# Patient Record
Sex: FEMALE | Race: WHITE | NOT HISPANIC OR LATINO | URBAN - METROPOLITAN AREA
[De-identification: names, ages, dates, MRNs, and addresses within clinical notes are randomized per-mention and may not be internally consistent; named-entity substitution may affect disease eponyms.]

---

## 2018-05-30 ENCOUNTER — IMPORTED ENCOUNTER (OUTPATIENT)
Dept: URBAN - METROPOLITAN AREA CLINIC 1 | Facility: CLINIC | Age: 23
End: 2018-05-30

## 2018-05-30 PROBLEM — H52.13: Noted: 2018-05-30

## 2018-05-30 PROCEDURE — S0620 ROUTINE OPHTHALMOLOGICAL EXA: HCPCS

## 2018-05-30 NOTE — PATIENT DISCUSSION
1. Myopia -- Glasses MRx was given for correction if indicated and requested2. Allergic Conjunctivitis OU -- Recommend OTC Zaditor BID OU PRN Itching OUAir Optix HydraGlyde CTL Trials given to patient today. Return for an appointment in 1 [address] [facility] with Dr. Mariah Guerrero. Return for an appointment in 1 YR 40 / [facility] with Dr. Mariah Guerrero.

## 2018-06-06 ENCOUNTER — IMPORTED ENCOUNTER (OUTPATIENT)
Dept: URBAN - METROPOLITAN AREA CLINIC 1 | Facility: CLINIC | Age: 23
End: 2018-06-06

## 2019-06-20 ENCOUNTER — IMPORTED ENCOUNTER (OUTPATIENT)
Dept: URBAN - METROPOLITAN AREA CLINIC 1 | Facility: CLINIC | Age: 24
End: 2019-06-20

## 2019-06-20 PROBLEM — H52.13: Noted: 2019-06-20

## 2019-06-20 PROCEDURE — S0621 ROUTINE OPHTHALMOLOGICAL EXA: HCPCS

## 2019-06-20 NOTE — PATIENT DISCUSSION
1. Myopia: Rx was given for correction if indicated and requested. 2. Allergic conj-OU- Cont using Zaditor BID OU PRN. (if symptoms worsen will REC daily lens) 3. Return for an appointment in 1 year for 42 and Contact lens check. with Dr. Zev Paz.

## 2022-03-11 ENCOUNTER — COMPREHENSIVE EXAM (OUTPATIENT)
Dept: URBAN - METROPOLITAN AREA CLINIC 1 | Facility: CLINIC | Age: 27
End: 2022-03-11

## 2022-03-11 DIAGNOSIS — Z01.00: ICD-10-CM

## 2022-03-11 DIAGNOSIS — H52.13: ICD-10-CM

## 2022-03-11 PROCEDURE — S0621 ROUTINE OPHTHALMOLOGICAL EXA: HCPCS

## 2022-03-11 ASSESSMENT — TONOMETRY
OS_IOP_MMHG: 14
OD_IOP_MMHG: 14

## 2022-03-11 ASSESSMENT — VISUAL ACUITY
OD_CC: 20/20
OD_CC: J1+
OS_CC: J1+
OS_CC: 20/20

## 2022-04-02 ASSESSMENT — KERATOMETRY
OD_AXISANGLE2_DEGREES: 102
OS_AXISANGLE2_DEGREES: 113
OS_K2POWER_DIOPTERS: 42.75
OS_K1POWER_DIOPTERS: 42.50
OD_AXISANGLE_DEGREES: 012
OS_AXISANGLE_DEGREES: 023
OD_K2POWER_DIOPTERS: 42.75
OD_K1POWER_DIOPTERS: 42.50

## 2022-04-02 ASSESSMENT — VISUAL ACUITY
OD_CC: J1+
OS_SC: 20/20
OD_SC: 20/25
OS_SC: 20/20
OD_SC: 20/20
OS_CC: J1+

## 2022-04-02 ASSESSMENT — TONOMETRY
OS_IOP_MMHG: 17
OD_IOP_MMHG: 15
OD_IOP_MMHG: 16
OS_IOP_MMHG: 15

## 2025-01-11 ENCOUNTER — HOSPITAL ENCOUNTER (EMERGENCY)
Facility: CLINIC | Age: 30
Discharge: HOME | End: 2025-01-11
Attending: EMERGENCY MEDICINE | Admitting: EMERGENCY MEDICINE

## 2025-01-11 ENCOUNTER — APPOINTMENT (OUTPATIENT)
Dept: GENERAL RADIOLOGY | Facility: CLINIC | Age: 30
End: 2025-01-11
Attending: EMERGENCY MEDICINE

## 2025-01-11 VITALS
SYSTOLIC BLOOD PRESSURE: 122 MMHG | DIASTOLIC BLOOD PRESSURE: 79 MMHG | OXYGEN SATURATION: 97 % | RESPIRATION RATE: 16 BRPM | HEART RATE: 87 BPM | TEMPERATURE: 98 F

## 2025-01-11 DIAGNOSIS — R06.02 SOB (SHORTNESS OF BREATH): ICD-10-CM

## 2025-01-11 DIAGNOSIS — J18.9 COMMUNITY ACQUIRED PNEUMONIA OF RIGHT MIDDLE LOBE OF LUNG: Primary | ICD-10-CM

## 2025-01-11 DIAGNOSIS — J45.909 REACTIVE AIRWAY DISEASE WITHOUT COMPLICATION, UNSPECIFIED ASTHMA SEVERITY, UNSPECIFIED WHETHER PERSISTENT: ICD-10-CM

## 2025-01-11 LAB
ANION GAP SERPL CALCULATED.3IONS-SCNC: 16 MMOL/L (ref 7–15)
ATRIAL RATE - MUSE: 128 BPM
BASE EXCESS BLDV CALC-SCNC: -1 MMOL/L (ref -3–3)
BASE EXCESS BLDV CALC-SCNC: -3 MMOL/L (ref -3–3)
BASOPHILS # BLD AUTO: 0.1 10E3/UL (ref 0–0.2)
BASOPHILS NFR BLD AUTO: 1 %
BUN SERPL-MCNC: 10.2 MG/DL (ref 6–20)
CALCIUM SERPL-MCNC: 9.5 MG/DL (ref 8.8–10.4)
CHLORIDE SERPL-SCNC: 104 MMOL/L (ref 98–107)
CREAT SERPL-MCNC: 0.97 MG/DL (ref 0.51–0.95)
D DIMER PPP FEU-MCNC: 0.39 UG/ML FEU (ref 0–0.5)
DIASTOLIC BLOOD PRESSURE - MUSE: NORMAL MMHG
EGFRCR SERPLBLD CKD-EPI 2021: 82 ML/MIN/1.73M2
EOSINOPHIL # BLD AUTO: 0.4 10E3/UL (ref 0–0.7)
EOSINOPHIL NFR BLD AUTO: 2 %
ERYTHROCYTE [DISTWIDTH] IN BLOOD BY AUTOMATED COUNT: 12.3 % (ref 10–15)
FLUAV RNA SPEC QL NAA+PROBE: NEGATIVE
FLUBV RNA RESP QL NAA+PROBE: NEGATIVE
GLUCOSE SERPL-MCNC: 133 MG/DL (ref 70–99)
HCG SERPL QL: NEGATIVE
HCO3 BLDV-SCNC: 21 MMOL/L (ref 21–28)
HCO3 BLDV-SCNC: 22 MMOL/L (ref 21–28)
HCO3 SERPL-SCNC: 21 MMOL/L (ref 22–29)
HCT VFR BLD AUTO: 43.3 % (ref 35–47)
HGB BLD-MCNC: 14.5 G/DL (ref 11.7–15.7)
IMM GRANULOCYTES # BLD: 0.1 10E3/UL
IMM GRANULOCYTES NFR BLD: 1 %
INTERPRETATION ECG - MUSE: NORMAL
LACTATE BLD-SCNC: 2 MMOL/L
LACTATE BLD-SCNC: 2.2 MMOL/L
LYMPHOCYTES # BLD AUTO: 3.4 10E3/UL (ref 0.8–5.3)
LYMPHOCYTES NFR BLD AUTO: 19 %
MCH RBC QN AUTO: 30.2 PG (ref 26.5–33)
MCHC RBC AUTO-ENTMCNC: 33.5 G/DL (ref 31.5–36.5)
MCV RBC AUTO: 90 FL (ref 78–100)
MONOCYTES # BLD AUTO: 1.5 10E3/UL (ref 0–1.3)
MONOCYTES NFR BLD AUTO: 8 %
NEUTROPHILS # BLD AUTO: 12.7 10E3/UL (ref 1.6–8.3)
NEUTROPHILS NFR BLD AUTO: 70 %
NRBC # BLD AUTO: 0 10E3/UL
NRBC BLD AUTO-RTO: 0 /100
P AXIS - MUSE: 79 DEGREES
PCO2 BLDV: 33 MM HG (ref 40–50)
PCO2 BLDV: 33 MM HG (ref 40–50)
PH BLDV: 7.4 [PH] (ref 7.32–7.43)
PH BLDV: 7.43 [PH] (ref 7.32–7.43)
PLATELET # BLD AUTO: 399 10E3/UL (ref 150–450)
PO2 BLDV: 52 MM HG (ref 25–47)
PO2 BLDV: 53 MM HG (ref 25–47)
POTASSIUM SERPL-SCNC: 4 MMOL/L (ref 3.4–5.3)
PR INTERVAL - MUSE: 168 MS
QRS DURATION - MUSE: 70 MS
QT - MUSE: 284 MS
QTC - MUSE: 414 MS
R AXIS - MUSE: 91 DEGREES
RBC # BLD AUTO: 4.8 10E6/UL (ref 3.8–5.2)
RSV RNA SPEC NAA+PROBE: NEGATIVE
SAO2 % BLDV: 87 % (ref 70–75)
SAO2 % BLDV: 88 % (ref 70–75)
SARS-COV-2 RNA RESP QL NAA+PROBE: NEGATIVE
SODIUM SERPL-SCNC: 141 MMOL/L (ref 135–145)
SYSTOLIC BLOOD PRESSURE - MUSE: NORMAL MMHG
T AXIS - MUSE: 55 DEGREES
TROPONIN T SERPL HS-MCNC: <6 NG/L
VENTRICULAR RATE- MUSE: 128 BPM
WBC # BLD AUTO: 18.1 10E3/UL (ref 4–11)

## 2025-01-11 PROCEDURE — 71046 X-RAY EXAM CHEST 2 VIEWS: CPT

## 2025-01-11 PROCEDURE — 99285 EMERGENCY DEPT VISIT HI MDM: CPT | Mod: 25

## 2025-01-11 PROCEDURE — 250N000013 HC RX MED GY IP 250 OP 250 PS 637: Performed by: EMERGENCY MEDICINE

## 2025-01-11 PROCEDURE — 250N000009 HC RX 250: Performed by: EMERGENCY MEDICINE

## 2025-01-11 PROCEDURE — 250N000012 HC RX MED GY IP 250 OP 636 PS 637: Performed by: EMERGENCY MEDICINE

## 2025-01-11 PROCEDURE — 96360 HYDRATION IV INFUSION INIT: CPT

## 2025-01-11 PROCEDURE — 36415 COLL VENOUS BLD VENIPUNCTURE: CPT | Performed by: EMERGENCY MEDICINE

## 2025-01-11 PROCEDURE — 85379 FIBRIN DEGRADATION QUANT: CPT | Performed by: EMERGENCY MEDICINE

## 2025-01-11 PROCEDURE — 84703 CHORIONIC GONADOTROPIN ASSAY: CPT | Performed by: EMERGENCY MEDICINE

## 2025-01-11 PROCEDURE — 258N000003 HC RX IP 258 OP 636: Performed by: EMERGENCY MEDICINE

## 2025-01-11 PROCEDURE — 94640 AIRWAY INHALATION TREATMENT: CPT

## 2025-01-11 PROCEDURE — 93005 ELECTROCARDIOGRAM TRACING: CPT

## 2025-01-11 PROCEDURE — 83605 ASSAY OF LACTIC ACID: CPT

## 2025-01-11 PROCEDURE — 84484 ASSAY OF TROPONIN QUANT: CPT | Performed by: EMERGENCY MEDICINE

## 2025-01-11 PROCEDURE — 82803 BLOOD GASES ANY COMBINATION: CPT

## 2025-01-11 PROCEDURE — 87637 SARSCOV2&INF A&B&RSV AMP PRB: CPT | Performed by: EMERGENCY MEDICINE

## 2025-01-11 PROCEDURE — 85014 HEMATOCRIT: CPT | Performed by: EMERGENCY MEDICINE

## 2025-01-11 PROCEDURE — 80048 BASIC METABOLIC PNL TOTAL CA: CPT | Performed by: EMERGENCY MEDICINE

## 2025-01-11 PROCEDURE — 85025 COMPLETE CBC W/AUTO DIFF WBC: CPT | Performed by: EMERGENCY MEDICINE

## 2025-01-11 RX ORDER — ALBUTEROL SULFATE 90 UG/1
1-2 INHALANT RESPIRATORY (INHALATION) EVERY 4 HOURS PRN
Qty: 18 G | Refills: 0 | Status: SHIPPED | OUTPATIENT
Start: 2025-01-11

## 2025-01-11 RX ORDER — PREDNISONE 20 MG/1
40 TABLET ORAL ONCE
Status: COMPLETED | OUTPATIENT
Start: 2025-01-11 | End: 2025-01-11

## 2025-01-11 RX ORDER — IPRATROPIUM BROMIDE AND ALBUTEROL SULFATE 2.5; .5 MG/3ML; MG/3ML
3 SOLUTION RESPIRATORY (INHALATION) ONCE
Status: COMPLETED | OUTPATIENT
Start: 2025-01-11 | End: 2025-01-11

## 2025-01-11 RX ORDER — DOXYCYCLINE 100 MG/1
100 CAPSULE ORAL 2 TIMES DAILY
Qty: 10 CAPSULE | Refills: 0 | Status: SHIPPED | OUTPATIENT
Start: 2025-01-12 | End: 2025-01-17

## 2025-01-11 RX ORDER — AMOXICILLIN 500 MG/1
1000 CAPSULE ORAL ONCE
Status: COMPLETED | OUTPATIENT
Start: 2025-01-11 | End: 2025-01-11

## 2025-01-11 RX ORDER — DOXYCYCLINE 100 MG/1
100 CAPSULE ORAL ONCE
Status: COMPLETED | OUTPATIENT
Start: 2025-01-11 | End: 2025-01-11

## 2025-01-11 RX ORDER — AMOXICILLIN 500 MG/1
1000 CAPSULE ORAL 3 TIMES DAILY
Qty: 30 CAPSULE | Refills: 0 | Status: SHIPPED | OUTPATIENT
Start: 2025-01-12 | End: 2025-01-17

## 2025-01-11 RX ADMIN — IPRATROPIUM BROMIDE AND ALBUTEROL SULFATE 3 ML: .5; 3 SOLUTION RESPIRATORY (INHALATION) at 17:57

## 2025-01-11 RX ADMIN — SODIUM CHLORIDE 1000 ML: 9 INJECTION, SOLUTION INTRAVENOUS at 19:57

## 2025-01-11 RX ADMIN — AMOXICILLIN 1000 MG: 500 CAPSULE ORAL at 20:41

## 2025-01-11 RX ADMIN — DOXYCYCLINE HYCLATE 100 MG: 100 CAPSULE ORAL at 20:41

## 2025-01-11 RX ADMIN — PREDNISONE 40 MG: 20 TABLET ORAL at 18:20

## 2025-01-11 ASSESSMENT — ACTIVITIES OF DAILY LIVING (ADL)
ADLS_ACUITY_SCORE: 41

## 2025-01-11 ASSESSMENT — COLUMBIA-SUICIDE SEVERITY RATING SCALE - C-SSRS
1. IN THE PAST MONTH, HAVE YOU WISHED YOU WERE DEAD OR WISHED YOU COULD GO TO SLEEP AND NOT WAKE UP?: NO
6. HAVE YOU EVER DONE ANYTHING, STARTED TO DO ANYTHING, OR PREPARED TO DO ANYTHING TO END YOUR LIFE?: NO
2. HAVE YOU ACTUALLY HAD ANY THOUGHTS OF KILLING YOURSELF IN THE PAST MONTH?: NO

## 2025-01-11 NOTE — ED TRIAGE NOTES
Sob started during the night, pt is from [place] and states she is allergic to cats and this always happens when she visits, took benadryl twice today

## 2025-01-12 NOTE — ED PROVIDER NOTES
"  Emergency Department Note      History of Present Illness     Chief Complaint  Shortness of Breath    HPI  Fanta Shore is a 29 year old female who presents to the ED with her fiance for evaluation of shortness of breath. Patient is visiting from [place]. She reports being allergic to cats but \"extremely allergic\" when she is around her parent's cat. She had increased shortness of breath and wheezing today when around the cat. She took 1 benadryl at 0600, 1 tablet at 1100, and 1 tablet at 1600 with no relief. She states chest pain radiating to her back when inhaling, slight cough and lightheadedness. No wheezing currently after receiving nebulizer in ED. Denies leg swelling, calf swelling, fever, recent illness,or runny nose. No history of blood clots or asthma. No chance of pregnancy. LMP was yesterday.      Independent Historian  None    Review of External Notes  None  Past Medical History   Medical History and Problem List   Acid reflux     Medications   Lexapro     Surgical History   Hymenectomy   Physical Exam   Patient Vitals for the past 24 hrs:   BP Temp Pulse Resp SpO2   01/11/25 2219 -- -- 87 -- 97 %   01/11/25 2111 -- -- 89 -- 95 %   01/11/25 2019 -- -- 95 -- 96 %   01/11/25 2000 -- -- 107 -- 95 %   01/11/25 1940 -- -- 117 -- 95 %   01/11/25 1748 122/79 98  F (36.7  C) (!) 130 16 96 %     Physical Exam  General: Alert and cooperative with exam. Patient in mild distress. Normal mentation.  Head:  Scalp is NC/AT  Eyes:  No scleral icterus, PERRL  ENT:  The external nose and ears are normal. The oropharynx is normal and without erythema; mucus membranes are moist. Uvula midline, no evidence of deep space infection.  Neck:  Normal range of motion without rigidity.  CV:  Tachycardic and regular rhythm    No pathologic murmur   Resp:  Breath sounds are clear bilaterally    Non-labored, no retractions or accessory muscle use  GI:  Abdomen is soft, no distension, no tenderness. No peritoneal signs  MS:  No " lower extremity edema   Skin:  Warm and dry, No rash or lesions noted.  Neuro: Oriented x 3. No gross motor deficits.  Diagnostics   Lab Results   Labs Ordered and Resulted from Time of ED Arrival to Time of ED Departure   BASIC METABOLIC PANEL - Abnormal       Result Value    Sodium 141      Potassium 4.0      Chloride 104      Carbon Dioxide (CO2) 21 (*)     Anion Gap 16 (*)     Urea Nitrogen 10.2      Creatinine 0.97 (*)     GFR Estimate 82      Calcium 9.5      Glucose 133 (*)    CBC WITH PLATELETS AND DIFFERENTIAL - Abnormal    WBC Count 18.1 (*)     RBC Count 4.80      Hemoglobin 14.5      Hematocrit 43.3      MCV 90      MCH 30.2      MCHC 33.5      RDW 12.3      Platelet Count 399      % Neutrophils 70      % Lymphocytes 19      % Monocytes 8      % Eosinophils 2      % Basophils 1      % Immature Granulocytes 1      NRBCs per 100 WBC 0      Absolute Neutrophils 12.7 (*)     Absolute Lymphocytes 3.4      Absolute Monocytes 1.5 (*)     Absolute Eosinophils 0.4      Absolute Basophils 0.1      Absolute Immature Granulocytes 0.1      Absolute NRBCs 0.0     ISTAT GASES LACTATE VENOUS POCT - Abnormal    Lactic Acid POCT 2.2 (*)     Bicarbonate Venous POCT 22      O2 Sat, Venous POCT 88 (*)     pCO2 Venous POCT 33 (*)     pH Venous POCT 7.43      pO2 Venous POCT 53 (*)     Base Excess/Deficit (+/-) POCT -1.0     ISTAT GASES LACTATE VENOUS POCT - Abnormal    Lactic Acid POCT 2.0      Bicarbonate Venous POCT 21      O2 Sat, Venous POCT 87 (*)     pCO2 Venous POCT 33 (*)     pH Venous POCT 7.40      pO2 Venous POCT 52 (*)     Base Excess/Deficit (+/-) POCT -3.0     D DIMER QUANTITATIVE - Normal    D-Dimer Quantitative 0.39     HCG QUALITATIVE PREGNANCY - Normal    hCG Serum Qualitative Negative     INFLUENZA A/B, RSV AND SARS-COV2 PCR - Normal    Influenza A PCR Negative      Influenza B PCR Negative      RSV PCR Negative      SARS CoV2 PCR Negative     TROPONIN T, HIGH SENSITIVITY - Normal    Troponin T, High  Sensitivity <6         Imaging  Chest XR,  PA & LAT   Final Result   IMPRESSION: Heart size is normal. Heterogeneous airspace disease is noted in the medial right lung base, partially obscuring the right heart border compatible with right middle lobe pneumonia. Left lung is clear. No pleural effusions or pneumothorax.    Clinical correlation and follow-up to resolution recommended.        Report per radiology    EKG   ECG taken at 1744, ECG read at 1816  Sinus tachycardia   Right atrial enlargement  Rightward axis   Rate 128 bpm. NE interval 168 ms. QRS duration 70 ms. QT/QTc 284/414 ms. P-R-T axes 79 91 55.     Independent Interpretation  CXR: Evidence of right middle lobe pneumonia/infiltrate.  ED Course    Medications Administered  Medications   ipratropium - albuterol 0.5 mg/2.5 mg/3 mL (DUONEB) neb solution 3 mL (3 mLs Nebulization $Given 1/11/25 1757)   predniSONE (DELTASONE) tablet 40 mg (40 mg Oral $Given 1/11/25 1820)   sodium chloride 0.9% BOLUS 1,000 mL (0 mLs Intravenous Stopped 1/11/25 2047)   amoxicillin (AMOXIL) capsule 1,000 mg (1,000 mg Oral $Given 1/11/25 2041)   doxycycline hyclate (VIBRAMYCIN) capsule 100 mg (100 mg Oral $Given 1/11/25 2041)     Procedures  Procedures     Discussion of Management  None    Additional Documentation  None    ED Course  ED Course as of 01/12/25 1350   Sat Jan 11, 2025   1808 I obtained history and examined the patient as noted above.    1934 I rechecked the patient and explained findings.    2011 I rechecked the patient and explained findings. Patient doesn't want admission.      Medical Decision Making / Diagnosis   CMS Diagnoses: None    MIPS     None    MDM  Patient is a 29-year-old female who presents with shortness of breath.  On evaluation patient is noted to be significantly tachycardic with a heart rate in the 120s and was noted at triage to be wheezing.  She was provided DuoNeb with significant improvement prior to my assessment.  Given initial concern for  reactive airway disease/allergic response (patient notes allergy to cats and has been staying with family members with cats), she was initially provided 40 mg prednisone and additional workup was undertaken including EKG, imaging, and labs.  No evidence of anaphylactic reaction.  EKG demonstrates a sinus tachycardia without evidence of acute ischemia or infarction.  Labs notable for undetectable troponin, normal D-dimer (PE unlikely) elevated lactic acid (2.2), significantly elevated white count (18.1), and negative influenza/COVID/RSV testing.  Chest x-ray demonstrates evidence of right middle lobe pneumonia which is likely contributing factor to presentation.  Patient initiated on amoxicillin as well as doxycycline to cover for potential atypical pneumonia.  Also received IV fluids with noted improvement in lactic acid.  On reassessment the patient's respiratory symptoms and tachycardia are resolved and she is overall well-appearing.  In shared decision making patient elected to continue to treat as outpatient.  She was discharged with antibiotics as well as albuterol inhaler.  Given significant improvement with just 1 DuoNeb, I do not feel that steroids need to be continued as outpatient.  Recommended avoidance of cat exposure and close follow-up with PCP to ensure resolution of symptoms when she returns home to [place].  Return precautions discussed.  Patient discharged home.    Disposition  The patient was discharged.     ICD-10 Codes:    ICD-10-CM    1. Community acquired pneumonia of right middle lobe of lung  J18.9       2. Reactive airway disease without complication, unspecified asthma severity, unspecified whether persistent  J45.909       3. SOB (shortness of breath)  R06.02            Discharge Medications  Discharge Medication List as of 1/11/2025 10:15 PM        START taking these medications    Details   albuterol (PROAIR HFA/PROVENTIL HFA/VENTOLIN HFA) 108 (90 Base) MCG/ACT inhaler Inhale 1-2 puffs  into the lungs every 4 hours as needed for shortness of breath, wheezing or cough., Disp-18 g, R-0, E-PrescribePharmacy may dispense brand covered by insurance (Proair, or proventil or ventolin or generic albuterol inhaler)      amoxicillin (AMOXIL) 500 MG capsule Take 2 capsules (1,000 mg) by mouth 3 times daily for 5 days., Disp-30 capsule, R-0, E-Prescribe      doxycycline hyclate (VIBRAMYCIN) 100 MG capsule Take 1 capsule (100 mg) by mouth 2 times daily for 5 days., Disp-10 capsule, R-0, E-Prescribe           Scribe Disclosure:  I, Parul Frederick, am serving as a scribe at 6:14 PM on 1/11/2025 to document services personally performed by Tim Flores DO based on my observations and the provider's statements to me.      Tim Flores DO  01/12/25 1403

## 2025-01-12 NOTE — DISCHARGE INSTRUCTIONS
Albuterol inhaler as needed for cough/wheezing    Continue antibiotics as prescribed with next dose tomorrow morning